# Patient Record
Sex: FEMALE | Race: WHITE | URBAN - METROPOLITAN AREA
[De-identification: names, ages, dates, MRNs, and addresses within clinical notes are randomized per-mention and may not be internally consistent; named-entity substitution may affect disease eponyms.]

---
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
## 2019-01-25 NOTE — PROGRESS NOTES
SUBJECTIVE:   CC: Althea Still is an 18 year old woman who presents for preventive health visit.     Healthy Habits:    Do you get at least three servings of calcium containing foods daily (dairy, green leafy vegetables, etc.)? yes and no, taking calcium and/or vitamin D supplement: yes -     Amount of exercise or daily activities, outside of work: 2-3 day(s) per week    Problems taking medications regularly No    Medication side effects: No    Have you had an eye exam in the past two years? yes    Do you see a dentist twice per year? yes    Do you have sleep apnea, excessive snoring or daytime drowsiness?no      ISSUES FOR TODAY:  1. Menses are more painful: bleeding is the same. Heavy bleeding the first few days, then better.   2. Family Hx Thyroid disease: in mother, would like thyroid checked today.  3. STD testing: desires all.      Today's PHQ-2 Score:   PHQ-2 ( 1999 Pfizer) 1/28/2019 6/8/2015   Q1: Little interest or pleasure in doing things 0 1   Q2: Feeling down, depressed or hopeless 0 1   PHQ-2 Score 0 2       Abuse: Current or Past(Physical, Sexual or Emotional)- No  Do you feel safe in your environment? Yes    Social History     Tobacco Use     Smoking status: Never Smoker     Smokeless tobacco: Never Used   Substance Use Topics     Alcohol use: No     If you drink alcohol do you typically have >3 drinks per day or >7 drinks per week? No                     Reviewed orders with patient.  Reviewed health maintenance and updated orders accordingly - Yes  Labs reviewed in EPIC  BP Readings from Last 3 Encounters:   01/28/19 100/74 (5 %/ 81 %)*   07/06/17 100/62   03/29/17 102/70 (14 %/ 63 %)*     *BP percentiles are based on the August 2017 AAP Clinical Practice Guideline for girls    Wt Readings from Last 3 Encounters:   01/28/19 55.5 kg (122 lb 6.4 oz) (39 %)*   07/06/17 57.2 kg (126 lb) (53 %)*   03/29/17 55.8 kg (123 lb) (48 %)*     * Growth percentiles are based on CDC (Girls, 2-20 Years) data.                   Patient Active Problem List   Diagnosis     Vitamin D deficiency     Iron deficiency anemia     No past surgical history on file.    Social History     Tobacco Use     Smoking status: Never Smoker     Smokeless tobacco: Never Used   Substance Use Topics     Alcohol use: No     No family history on file.      Current Outpatient Medications   Medication Sig Dispense Refill     Cholecalciferol (VITAMIN D) 2000 UNITS tablet Take 2,000 Units by mouth daily 100 tablet 3     norgestimate-ethinyl estradiol (ORTHO-CYCLEN/SPRINTEC) 0.25-35 MG-MCG tablet Take 1 tablet by mouth daily May skip placebo week every other pill pack for menses every 7 weeks. 84 tablet 4     Allergies   Allergen Reactions     Keflex [Cephalexin-Fd&C Yellow #6] Diarrhea       Mammogram not appropriate for this patient based on age.    Pertinent mammograms are reviewed under the imaging tab.  History of abnormal Pap smear: NO - under age 21, PAP not appropriate for age     Reviewed and updated as needed this visit by clinical staff  Tobacco  Allergies  Meds         Reviewed and updated as needed this visit by Provider        No past medical history on file.   No past surgical history on file.  Obstetric History     No data available          ROS:  CONSTITUTIONAL: NEGATIVE for fever, chills, change in weight  INTEGUMENTARU/SKIN: NEGATIVE for worrisome rashes, moles or lesions  EYES: NEGATIVE for vision changes or irritation  ENT: NEGATIVE for ear, mouth and throat problems  RESP: NEGATIVE for significant cough or SOB  BREAST: NEGATIVE for masses, tenderness or discharge  CV: NEGATIVE for chest pain, palpitations or peripheral edema  GI: NEGATIVE for nausea, abdominal pain, heartburn, or change in bowel habits  : NEGATIVE for unusual urinary or vaginal symptoms. Periods are regular.  MUSCULOSKELETAL: NEGATIVE for significant arthralgias or myalgia  NEURO: NEGATIVE for weakness, dizziness or paresthesias  PSYCHIATRIC: NEGATIVE for  "changes in mood or affect    OBJECTIVE:   /74   Pulse 90   Resp 16   Ht 1.702 m (5' 7\")   Wt 55.5 kg (122 lb 6.4 oz)   LMP 01/15/2019 (Exact Date)   SpO2 97%   Breastfeeding? No   BMI 19.17 kg/m    EXAM:  GENERAL: healthy, alert and no distress  EYES: Eyes grossly normal to inspection, PERRL and conjunctivae and sclerae normal  HENT: ear canals and TM's normal, nose and mouth without ulcers or lesions  NECK: no adenopathy, no asymmetry, masses, or scars and thyroid normal to palpation  RESP: lungs clear to auscultation - no rales, rhonchi or wheezes  BREAST: normal without masses, tenderness or nipple discharge and no palpable axillary masses or adenopathy  CV: regular rate and rhythm, normal S1 S2, no S3 or S4, no murmur, click or rub, no peripheral edema and peripheral pulses strong  ABDOMEN: soft, nontender, no hepatosplenomegaly, no masses and bowel sounds normal  MS: no gross musculoskeletal defects noted, no edema  SKIN: no suspicious lesions or rashes  NEURO: Normal strength and tone, mentation intact and speech normal  PSYCH: mentation appears normal, affect normal/bright        ASSESSMENT/PLAN:   Althea was seen today for physical.    Diagnoses and all orders for this visit:    Routine general medical examination at a health care facility  Td is UTD. She is given booster of MCV4, given her second HPV, and first Men B.   -     MCV4, MENINGOCOCCAL CONJ, IM (2 MO - 55 YRS) - Menveo  -     MEN B, IM (10 - 25 YRS) - Bexsero  -     HPV, IM (9 - 26 YRS) - Gardasil 9  Recommend healthy nutrition, regular exercise, and discussed risky behaviors.     Menometrorrhagia  -     TSH ([facility])  -     CBC with Diff/Plt ([facility])  -     norgestimate-ethinyl estradiol (ORTHO-CYCLEN/SPRINTEC) 0.25-35 MG-MCG tablet; Take 1 tablet by mouth daily May skip placebo week every other pill pack for menses every 7 weeks.  Start BCP with next menses. Discussed risks and benefits of the BCP.    Vitamin D deficiency  -     " "Vitamin D  25-Hydroxy ([facility])  Need to verify proper replacement.    Screen for STD (sexually transmitted disease)  -     HIV 1/0/2 Rflx ([facility])  -     Chlamydia/GC Amplification ([facility])  -     Rapid Plasma Reagin  Quant ([facility])  -     HBsAg Screen ([facility])  Recommend condoms for prevention of STD          COUNSELING:   Reviewed preventive health counseling, as reflected in patient instructions       Regular exercise       Healthy diet/nutrition       Vision screening       Hearing screening       Immunizations    Vaccinated for: Human Papillomavirus and Meningococcal             Contraception       Family planning       Safe sex practices/STD prevention    BP Readings from Last 1 Encounters:   01/28/19 100/74 (5 %/ 81 %)*     *BP percentiles are based on the August 2017 AAP Clinical Practice Guideline for girls     Estimated body mass index is 19.17 kg/m  as calculated from the following:    Height as of this encounter: 1.702 m (5' 7\").    Weight as of this encounter: 55.5 kg (122 lb 6.4 oz).     reports that  has never smoked. she has never used smokeless tobacco.      Counseling Resources:  ATP IV Guidelines  Pooled Cohorts Equation Calculator  Breast Cancer Risk Calculator  FRAX Risk Assessment  ICSI Preventive Guidelines  Dietary Guidelines for Americans, 2010  USDA's MyPlate  ASA Prophylaxis  Lung CA Screening    Lupe Melgar MD  [facility]  "

## 2019-01-28 ENCOUNTER — OFFICE VISIT (OUTPATIENT)
Dept: FAMILY MEDICINE | Facility: CLINIC | Age: 19
End: 2019-01-28

## 2019-01-28 VITALS
DIASTOLIC BLOOD PRESSURE: 74 MMHG | HEART RATE: 90 BPM | BODY MASS INDEX: 19.21 KG/M2 | RESPIRATION RATE: 16 BRPM | SYSTOLIC BLOOD PRESSURE: 100 MMHG | HEIGHT: 67 IN | OXYGEN SATURATION: 97 % | WEIGHT: 122.4 LBS

## 2019-01-28 DIAGNOSIS — E55.9 VITAMIN D DEFICIENCY: ICD-10-CM

## 2019-01-28 DIAGNOSIS — Z11.3 SCREEN FOR STD (SEXUALLY TRANSMITTED DISEASE): ICD-10-CM

## 2019-01-28 DIAGNOSIS — Z23 NEED FOR HPV VACCINATION: ICD-10-CM

## 2019-01-28 DIAGNOSIS — Z00.00 ROUTINE GENERAL MEDICAL EXAMINATION AT A HEALTH CARE FACILITY: Primary | ICD-10-CM

## 2019-01-28 DIAGNOSIS — N92.1 MENOMETRORRHAGIA: ICD-10-CM

## 2019-01-28 DIAGNOSIS — Z23 NEED FOR MENINGITIS VACCINATION: ICD-10-CM

## 2019-01-28 LAB
% GRANULOCYTES: 61.9 % (ref 42.2–75.2)
HCT VFR BLD AUTO: 44.3 % (ref 35–46)
HEMOGLOBIN: 14.3 G/DL (ref 11.8–15.5)
LYMPHOCYTES NFR BLD AUTO: 31.9 % (ref 20.5–51.1)
MCH RBC QN AUTO: 26.4 PG (ref 27–31)
MCHC RBC AUTO-ENTMCNC: 32.4 G/DL (ref 33–37)
MCV RBC AUTO: 81.4 FL (ref 80–100)
MONOCYTES NFR BLD AUTO: 6.2 % (ref 1.7–9.3)
PLATELET # BLD AUTO: 245 K/UL (ref 140–450)
RBC # BLD AUTO: 5.44 X10/CMM (ref 3.7–5.2)
WBC # BLD AUTO: 6 X10/CMM (ref 3.8–11)

## 2019-01-28 PROCEDURE — 90620 MENB-4C VACCINE IM: CPT | Performed by: FAMILY MEDICINE

## 2019-01-28 PROCEDURE — 99395 PREV VISIT EST AGE 18-39: CPT | Mod: 25 | Performed by: FAMILY MEDICINE

## 2019-01-28 PROCEDURE — 90734 MENACWYD/MENACWYCRM VACC IM: CPT | Performed by: FAMILY MEDICINE

## 2019-01-28 PROCEDURE — 36415 COLL VENOUS BLD VENIPUNCTURE: CPT | Performed by: FAMILY MEDICINE

## 2019-01-28 PROCEDURE — 90471 IMMUNIZATION ADMIN: CPT | Performed by: FAMILY MEDICINE

## 2019-01-28 PROCEDURE — 90651 9VHPV VACCINE 2/3 DOSE IM: CPT | Performed by: FAMILY MEDICINE

## 2019-01-28 PROCEDURE — 85025 COMPLETE CBC W/AUTO DIFF WBC: CPT | Performed by: FAMILY MEDICINE

## 2019-01-28 PROCEDURE — 90472 IMMUNIZATION ADMIN EACH ADD: CPT | Performed by: FAMILY MEDICINE

## 2019-01-28 RX ORDER — NORGESTIMATE AND ETHINYL ESTRADIOL 0.25-0.035
1 KIT ORAL DAILY
Qty: 84 TABLET | Refills: 4 | Status: SHIPPED | OUTPATIENT
Start: 2019-01-28 | End: 2020-01-31

## 2019-01-28 ASSESSMENT — ANXIETY QUESTIONNAIRES
7. FEELING AFRAID AS IF SOMETHING AWFUL MIGHT HAPPEN: NOT AT ALL
5. BEING SO RESTLESS THAT IT IS HARD TO SIT STILL: NOT AT ALL
GAD7 TOTAL SCORE: 4
3. WORRYING TOO MUCH ABOUT DIFFERENT THINGS: SEVERAL DAYS
IF YOU CHECKED OFF ANY PROBLEMS ON THIS QUESTIONNAIRE, HOW DIFFICULT HAVE THESE PROBLEMS MADE IT FOR YOU TO DO YOUR WORK, TAKE CARE OF THINGS AT HOME, OR GET ALONG WITH OTHER PEOPLE: NOT DIFFICULT AT ALL
1. FEELING NERVOUS, ANXIOUS, OR ON EDGE: SEVERAL DAYS
6. BECOMING EASILY ANNOYED OR IRRITABLE: NOT AT ALL
2. NOT BEING ABLE TO STOP OR CONTROL WORRYING: SEVERAL DAYS

## 2019-01-28 ASSESSMENT — MIFFLIN-ST. JEOR: SCORE: 1362.83

## 2019-01-28 ASSESSMENT — PATIENT HEALTH QUESTIONNAIRE - PHQ9
5. POOR APPETITE OR OVEREATING: SEVERAL DAYS
SUM OF ALL RESPONSES TO PHQ QUESTIONS 1-9: 5

## 2019-01-28 NOTE — LETTER
[place] [id number] [place]  [address]  Phone: [phone]    January 31, 2019      Althea Still  [address]  [facility] [address]              Dear Althea,    All STD testing is normal including HIV, hepatitis B, syphilis, gonorrhea, and chlamydia.   Normal blood counts.   Vitamin Dis low. Recommend taking vitamin D3 2,000 international unit(s) daily, and continue year round always.         Sincerely,     Lupe Melgar M.D.    Results for orders placed or performed in visit on 01/28/19   HIV 1/0/2 Rflx ([facility])   Result Value Ref Range    HIV Screen 4th Gen with Rflx Non Reactive Non Reactive    Narrative    Performed at:  01 - [facility] [address], [place], [place]  [id number]  : Mike Hidalgo MD, Phone:  [phone]   CBC with Diff/Plt ([facility])   Result Value Ref Range    WBC x10/cmm 6.0 3.8 - 11.0 x10/cmm    % Lymphocytes 31.9 20.5 - 51.1 %    % Monocytes 6.2 1.7 - 9.3 %    % Granulocytes 61.9 42.2 - 75.2 %    RBC x10/cmm 5.44 (A) 3.7 - 5.2 x10/cmm    Hemoglobin 14.3 11.8 - 15.5 g/dl    Hematocrit 44.3 35 - 46 %    MCV 81.4 80 - 100 fL    MCH 26.4 (A) 27.0 - 31.0 pg    MCHC 32.4 (A) 33.0 - 37.0 g/dL    Platelet Count 245 140 - 450 K/uL   Chlamydia/GC Amplification ([facility])   Result Value Ref Range    Chlamydia Trachomatis Karen Negative Negative    Neisseria gonorrhoeae, KAREN Negative Negative    Narrative    Performed at:  01 - [facility] [place]  [address], [place], [place]  [id number]  : Mike Hidalgo MD, Phone:  [phone]   Vitamin D  25-Hydroxy ([facility])   Result Value Ref Range    Vitamin D,25-Hydroxy 25.7 (L) 30.0 - 100.0 ng/mL    Narrative    Performed at:  01 - [facility] [address] [place], [place]  [id number]  : Mike Hidalgo MD, Phone:  [phone]   Rapid Plasma Reagin  Quant ([facility])   Result Value Ref Range    Rapid Plasma Reagin, Quant Non Reactive NonRea<1:1    Narrative    Performed at:  01 -  [facility] [address], [place], [place]  [id number]  : Mike Hidalgo MD, Phone:  [phone]   TSH ([facility])   Result Value Ref Range    TSH 1.490 0.450 - 4.500 uIU/mL    Narrative    Performed at:  01 - [facility] [address], [place], [place]  [id number]  : Mike Hidalgo MD, Phone:  [phone]   HBsAg Screen ([facility])   Result Value Ref Range    HBsAg Screen Negative Negative    Narrative    Performed at:  01 - [facility] [address], [place], [place]  [id number]  : Mike Hidalgo MD, Phone:  [phone]

## 2019-01-29 LAB
HBSAG SCREEN: NEGATIVE
HIV SCREEN 4TH GEN WITH RFLX: NON REACTIVE
RAPID PLASMA REAGIN, QUANT: NON REACTIVE
TSH BLD-ACNC: 1.49 UIU/ML (ref 0.45–4.5)
VITAMIN D, 25-HYDROXY: 25.7 NG/ML (ref 30–100)

## 2019-01-29 ASSESSMENT — ANXIETY QUESTIONNAIRES: GAD7 TOTAL SCORE: 4

## 2019-01-30 LAB
C TRACH DNA SPEC QL PROBE+SIG AMP: NEGATIVE
N GONORRHOEA DNA SPEC QL PROBE+SIG AMP: NEGATIVE

## 2019-06-03 ENCOUNTER — OFFICE VISIT (OUTPATIENT)
Dept: FAMILY MEDICINE | Facility: CLINIC | Age: 19
End: 2019-06-03

## 2019-06-03 VITALS
SYSTOLIC BLOOD PRESSURE: 110 MMHG | OXYGEN SATURATION: 98 % | DIASTOLIC BLOOD PRESSURE: 76 MMHG | BODY MASS INDEX: 19.26 KG/M2 | HEART RATE: 81 BPM | RESPIRATION RATE: 16 BRPM | WEIGHT: 123 LBS

## 2019-06-03 DIAGNOSIS — L73.9 FOLLICULITIS: Primary | ICD-10-CM

## 2019-06-03 PROCEDURE — 99213 OFFICE O/P EST LOW 20 MIN: CPT | Performed by: FAMILY MEDICINE

## 2019-06-03 NOTE — PROGRESS NOTES
Problem(s) Oriented visit        SUBJECTIVE:                                                    Althea Still is a 19 year old female who presents to clinic today for rash on her legs that seems to migrate to different spots. It itches. It seems better after she has used a sugar scrub for exfoliation. She uses [place] soap and always has. She tries to moisturize regularly. She also notes that it seemed a bit better after she reduced how often she shaves her legs.      Problem list, Medication list, Allergies, and Medical/Social/Surgical histories reviewed in [facility] and updated as appropriate.   Additional history: as documented    ROS:  5 point ROS completed and negative except noted above, including Gen, CV, Resp, GI, MS      Histories:   Patient Active Problem List   Diagnosis     Vitamin D deficiency     Iron deficiency anemia     No past surgical history on file.    Social History     Tobacco Use     Smoking status: Never Smoker     Smokeless tobacco: Never Used   Substance Use Topics     Alcohol use: No     Family History   Problem Relation Age of Onset     Thyroid Disease Mother            OBJECTIVE:                                                    /76   Pulse 81   Resp 16   Wt 55.8 kg (123 lb)   SpO2 98%   BMI 19.26 kg/m    Body mass index is 19.26 kg/m .   GENERAL APPEARANCE: Alert, no acute distress  SKIN: hypertrophy and mild pink color change is noted in many of the hair follicles of the lower thighs, and both anterior and posterior lower legs. No erythema. Some skin changes related to chronic scratching noted.   NEURO: Alert, oriented, speech and mentation normal  PSYCH: mentation appears normal, affect and mood normal   Labs Resulted Today:   Results for orders placed or performed in visit on 01/28/19   HIV 1/0/2 Rflx ([facility])   Result Value Ref Range    HIV Screen 4th Gen with Rflx Non Reactive Non Reactive    Narrative    Performed at:  01 - [facility]  [address] [facility],  [place], [place]  [id number]  : Mike Hidalgo MD, Phone:  [phone]   CBC with Diff/Plt ([facility])   Result Value Ref Range    WBC x10/cmm 6.0 3.8 - 11.0 x10/cmm    % Lymphocytes 31.9 20.5 - 51.1 %    % Monocytes 6.2 1.7 - 9.3 %    % Granulocytes 61.9 42.2 - 75.2 %    RBC x10/cmm 5.44 (A) 3.7 - 5.2 x10/cmm    Hemoglobin 14.3 11.8 - 15.5 g/dl    Hematocrit 44.3 35 - 46 %    MCV 81.4 80 - 100 fL    MCH 26.4 (A) 27.0 - 31.0 pg    MCHC 32.4 (A) 33.0 - 37.0 g/dL    Platelet Count 245 140 - 450 K/uL   Chlamydia/GC Amplification ([facility])   Result Value Ref Range    Chlamydia Trachomatis Karen Negative Negative    Neisseria gonorrhoeae, KAREN Negative Negative    Narrative    Performed at:  01 - [facility] [place]  [address], [place], [place]  [id number]  : Mike Hidalgo MD, Phone:  [phone]   Vitamin D  25-Hydroxy ([facility])   Result Value Ref Range    Vitamin D,25-Hydroxy 25.7 (L) 30.0 - 100.0 ng/mL    Narrative    Performed at:  01 - [facility] [address], [place], [place]  [id number]  : Mike Hidalgo MD, Phone:  [phone]   Rapid Plasma Reagin  Quant ([facility])   Result Value Ref Range    Rapid Plasma Reagin, Quant Non Reactive NonRea<1:1    Narrative    Performed at:  01 - [facility] [address], [place], [place]  [id number]  : Mike Hidalgo MD, Phone:  [phone]   TSH ([facility])   Result Value Ref Range    TSH 1.490 0.450 - 4.500 uIU/mL    Narrative    Performed at:  01 - [facility] [address], [place], [place]  [id number]  : Mike Hidalgo MD, Phone:  [phone]   HBsAg Screen ([facility])   Result Value Ref Range    HBsAg Screen Negative Negative    Narrative    Performed at:  01 - [facility] [address], [place], [place]  [id number]  : Mike Hidalgo MD, Phone:  [phone]     ASSESSMENT/PLAN:                                                        Althea was seen today for derm problem.    Diagnoses and all orders for this  visit:    Folliculitis/hypertrophy  Non-infectious, see below.      Patient Instructions   STOP using harsh soaps like [place].  Recommend using a mild soap like Dove or Caress. You may also consider a non-soap cleanser like Cetaphil.   Salt or sugar scrubs are helpful for exfoliation.  Use a non-perfume hydrating lotion such as Eucerin or Lubriderm.      The following health maintenance items are reviewed in Epic and correct as of today:  Health Maintenance   Topic Date Due     HIV SCREENING  03/12/2014     INFLUENZA VACCINE (Season Ended) 09/01/2019     PREVENTIVE CARE VISIT  01/28/2020     DTAP/TDAP/TD IMMUNIZATION (7 - Td) 07/18/2021     ZOSTER IMMUNIZATION (1 of 2) 03/12/2049     PHQ-2  Completed     IPV IMMUNIZATION  Completed     HPV IMMUNIZATION  Completed     MENINGITIS IMMUNIZATION  Aged Out       Lupe Melgar MD  [facility]  Family Practice  [facility]  [phone]    For any issues my office # is [phone]

## 2019-06-03 NOTE — PATIENT INSTRUCTIONS
STOP using harsh soaps like [place].  Recommend using a mild soap like Dove or Caress. You may also consider a non-soap cleanser like Cetaphil.   Salt or sugar scrubs are helpful for exfoliation.  Use a non-perfume hydrating lotion such as Eucerin or Lubriderm.

## 2019-07-29 DIAGNOSIS — Z23 NEED FOR HPV VACCINATION: Primary | ICD-10-CM

## 2019-07-29 PROCEDURE — 90651 9VHPV VACCINE 2/3 DOSE IM: CPT | Performed by: FAMILY MEDICINE

## 2019-07-29 PROCEDURE — 90471 IMMUNIZATION ADMIN: CPT | Performed by: FAMILY MEDICINE
